# Patient Record
Sex: MALE | Race: WHITE
[De-identification: names, ages, dates, MRNs, and addresses within clinical notes are randomized per-mention and may not be internally consistent; named-entity substitution may affect disease eponyms.]

---

## 2017-05-01 NOTE — OPERATIVE REPORT
Operative Report


DATE OF SURGERY: 05/01/17


Operative Report: 


The risks benefits and alternatives of the procedure explained to the patient 

in detail and informed consent is obtained that GIF Olympus video scope was 

inserted into the patient's mouth and hypopharynx the esophagus is identified 

intubated and insufflated the scope was then advanced through the esophagus 

stomach and duodenum retroflexion maneuver is done the esophagus stomach and 

first and second portions of the duodenum examined


PREOPERATIVE DIAGNOSIS: Rectal bleeding.  Patient originally scheduled to have 

colonoscopy done today but did not drink the prep.  He will need to be 

rescheduled.  Known history of Vasquez's esophagus.  We'll proceed with EGD 

today.


POSTOPERATIVE DIAGNOSIS: Vasquez's esophagus status post ablation.  Gastritis


OPERATION: EGD with ablation.  EGD with biopsy


SURGEON: FABRICIO CARBAJAL


ANESTHESIA: LMAC


TISSUE REMOVED OR ALTERED: Gastric mucosal specimen obtained rule out 

Helicobacter pylori


COMPLICATIONS: 


None.


ESTIMATED BLOOD LOSS: none.


INTRAOPERATIVE FINDINGS: As described above.


PROCEDURE: 


Patient tolerated procedure well.


No immediate postprocedure complications are noted.


Patient is discharged in good condition.


Discharge diet: Regular.


Discharge activity: Regular.


Discharge date 05/01/2017.


We'll need to reschedule for his colonoscopy.


Patient is instructed to call the office or proceed to the emergency room 

should there be any further problems or questions

## 2017-05-22 NOTE — OPERATIVE REPORT
Operative Report


DATE OF SURGERY: 05/22/17


Operative Report: 


The risks, benefits and alternatives of the procedure including risks of 

bleeding, perforation requiring surgery are explained to the patient in detail 

and informed consent was obtained.  Patient was taken back to the endoscopy 

suite and placed in the left, lateral decubital position.  Rectal examination 

was done which did not reveal any masses tenderness or fissures.  Timeout was 

called.  Propofol medications administered.  An Olympus endoscope was inserted 

into the patient's rectum.  The scope was then gradually advanced all the way 

to the cecum.  The cecum was identified by the usual anatomical landmarks 

including the ileocecal valve as well as appendiceal office.  

Photodocumentation was obtained.  Prep was reasonably good.  The scope was then 

sequentially pulled back the interventions of the colon including the ascending 

colon, hepatic flexure, transverse colon, splenic flexure, descending colon and 

finally to the rectosigmoid portions of the colon.  Retroflexion maneuver was 

performed.


PREOPERATIVE DIAGNOSIS: Rectal bleeding.  Colorectal cancer screening


POSTOPERATIVE DIAGNOSIS: Internal hemorrhoids.  Cecal polyp noted and removed 

via snare polypectomy and retrieved.  No AVMs, diverticulosis noted


OPERATION: Colonoscopy with snare polypectomy


SURGEON: FABRICIO CARBAJAL


ANESTHESIA: LMAC


TISSUE REMOVED OR ALTERED: Colon polyp retrieved


COMPLICATIONS: 


None.


ESTIMATED BLOOD LOSS: None.


INTRAOPERATIVE FINDINGS: As described above.


PROCEDURE: 


Patient tolerated procedure well.


No immediate postprocedure complications are noted.


Patient discharged in good condition.


Discharge date 5/22/2017.


Discharge diet: Regular.


Discharge activity: Regular.


2-3 week follow-up to discuss findings.


Patient is instructed to call the office or proceed to the emergency room 

should there be any further problems or questions.


5 year surveillance colonoscopy.


We will wait on pathology.
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
## 2018-03-26 ENCOUNTER — HOSPITAL ENCOUNTER (OUTPATIENT)
Dept: HOSPITAL 62 - RAD | Age: 63
End: 2018-03-26
Attending: FAMILY MEDICINE
Payer: MEDICARE

## 2018-03-26 DIAGNOSIS — R63.4: ICD-10-CM

## 2018-03-26 DIAGNOSIS — M12.812: ICD-10-CM

## 2018-03-26 DIAGNOSIS — M25.511: Primary | ICD-10-CM

## 2018-03-26 PROCEDURE — 71046 X-RAY EXAM CHEST 2 VIEWS: CPT

## 2018-03-26 NOTE — RADIOLOGY REPORT (SQ)
EXAM DESCRIPTION:  SHOULDER BILAT 2 OR MORE VIEWS



COMPLETED DATE/TIME:  3/26/2018 12:16 pm



REASON FOR STUDY:  CHRONIC PAIN OF BOTH SHOULDERS M25.511  PAIN IN RIGHT SHOULDER R63.4  ABNORMAL ESTHELA
GHT LOSS



COMPARISON:  None.



NUMBER OF VIEWS:  Six views.



TECHNIQUE:  Internal rotation, external rotation, and Y view images acquired of the right and left sh
oulder.



LIMITATIONS:  None.



FINDINGS:  Orthopedic screw in the left humeral head.  Advanced glenohumeral joint arthropathy on the
 left.  Less severe arthropathy in the right glenohumeral joint.  Moderate AC joint arthropathy bilat
erally.



IMPRESSION:  AC and glenohumeral joint arthropathy.



TECHNICAL DOCUMENTATION:  JOB ID:  [id number]

 2011 [employer]- All Rights Reserved



Reading location - IP/workstation name: [email or web address]

## 2018-03-26 NOTE — RADIOLOGY REPORT (SQ)
EXAM DESCRIPTION:  CHEST PA/LAT



COMPLETED DATE/TIME:  3/26/2018 12:16 pm



REASON FOR STUDY:  WEIGHT LOSS



COMPARISON:  5/5/2016



EXAM PARAMETERS:  NUMBER OF VIEWS: two views

TECHNIQUE: Digital Frontal and Lateral radiographic views of the chest acquired.

RADIATION DOSE: NA

LIMITATIONS: none



FINDINGS:  LUNGS AND PLEURA: Stable scarring in the lingula. No pleural effusion.

MEDIASTINUM AND HILAR STRUCTURES: No masses or contour abnormalities.

HEART AND VASCULAR STRUCTURES: Heart normal size.  No evidence for failure.

BONES: No acute findings.

HARDWARE: None in the chest.

OTHER: No other significant finding.



IMPRESSION:  NO ACUTE RADIOGRAPHIC FINDING IN THE CHEST.



TECHNICAL DOCUMENTATION:  JOB ID:  [id number]

 2011 [employer]- All Rights Reserved



Reading location - IP/workstation name: [email or web address]